# Patient Record
Sex: MALE | Race: WHITE | NOT HISPANIC OR LATINO | Employment: FULL TIME | URBAN - METROPOLITAN AREA
[De-identification: names, ages, dates, MRNs, and addresses within clinical notes are randomized per-mention and may not be internally consistent; named-entity substitution may affect disease eponyms.]

---

## 2021-02-10 DIAGNOSIS — Z31.41 FERTILITY TESTING: Primary | ICD-10-CM

## 2021-02-10 PROCEDURE — 89320 SEMEN ANAL VOL/COUNT/MOT: CPT | Performed by: OBSTETRICS & GYNECOLOGY

## 2021-02-24 NOTE — PROGRESS NOTES
Spoke with pt's girlfriend who is seeing Dr. Johnson for infertility.  The girlfriend states that pt has not received a call regarding scheduling his semen analysis.  I do see that a semen analysis was placed on 2/10 by Dr. Johnson but it may be the wrong order.  T'd up the correct semen analysis order and will route to Dr. Johnson to sign.  Gave pt's girlfriend the phone number to call if pt hasn't heard from the andrology lab to get the semen analysis scheduled.    Kelsi Correia, RN

## 2021-02-25 NOTE — PROGRESS NOTES
Radha Johnson, Kelsi Martinez, RN 2 hours ago (7:30 AM)     Very strange, not sure why it was not signed. I completed the order, please let me know if there are any other issues.     Radha Johnson DO    Message text      Violette Ivory, RN on 2/25/2021 at 9:45 AM

## 2022-09-04 ENCOUNTER — APPOINTMENT (OUTPATIENT)
Dept: GENERAL RADIOLOGY | Facility: CLINIC | Age: 42
End: 2022-09-04
Attending: EMERGENCY MEDICINE
Payer: COMMERCIAL

## 2022-09-04 ENCOUNTER — HOSPITAL ENCOUNTER (EMERGENCY)
Facility: CLINIC | Age: 42
Discharge: HOME | End: 2022-09-04
Attending: EMERGENCY MEDICINE | Admitting: EMERGENCY MEDICINE
Payer: COMMERCIAL

## 2022-09-04 VITALS
HEART RATE: 92 BPM | OXYGEN SATURATION: 98 % | SYSTOLIC BLOOD PRESSURE: 132 MMHG | TEMPERATURE: 99 F | DIASTOLIC BLOOD PRESSURE: 88 MMHG | RESPIRATION RATE: 16 BRPM

## 2022-09-04 DIAGNOSIS — S91.331A PUNCTURE WOUND OF RIGHT FOOT, INITIAL ENCOUNTER: ICD-10-CM

## 2022-09-04 PROCEDURE — 99284 EMERGENCY DEPT VISIT MOD MDM: CPT | Mod: 25 | Performed by: EMERGENCY MEDICINE

## 2022-09-04 PROCEDURE — 73630 X-RAY EXAM OF FOOT: CPT | Mod: RT

## 2022-09-04 PROCEDURE — 250N000011 HC RX IP 250 OP 636: Performed by: EMERGENCY MEDICINE

## 2022-09-04 PROCEDURE — 90471 IMMUNIZATION ADMIN: CPT | Performed by: EMERGENCY MEDICINE

## 2022-09-04 PROCEDURE — 90715 TDAP VACCINE 7 YRS/> IM: CPT | Performed by: EMERGENCY MEDICINE

## 2022-09-04 PROCEDURE — 99284 EMERGENCY DEPT VISIT MOD MDM: CPT | Performed by: EMERGENCY MEDICINE

## 2022-09-04 RX ORDER — CIPROFLOXACIN 500 MG/1
500 TABLET, FILM COATED ORAL 2 TIMES DAILY
Qty: 14 TABLET | Refills: 0 | Status: SHIPPED | OUTPATIENT
Start: 2022-09-04 | End: 2022-09-09

## 2022-09-04 RX ADMIN — CLOSTRIDIUM TETANI TOXOID ANTIGEN (FORMALDEHYDE INACTIVATED), CORYNEBACTERIUM DIPHTHERIAE TOXOID ANTIGEN (FORMALDEHYDE INACTIVATED), BORDETELLA PERTUSSIS TOXOID ANTIGEN (GLUTARALDEHYDE INACTIVATED), BORDETELLA PERTUSSIS FILAMENTOUS HEMAGGLUTININ ANTIGEN (FORMALDEHYDE INACTIVATED), BORDETELLA PERTUSSIS PERTACTIN ANTIGEN, AND BORDETELLA PERTUSSIS FIMBRIAE 2/3 ANTIGEN 0.5 ML: 5; 2; 2.5; 5; 3; 5 INJECTION, SUSPENSION INTRAMUSCULAR at 21:28

## 2022-09-05 NOTE — ED PROVIDER NOTES
History     Chief Complaint   Patient presents with     Puncture Wound     R foot, stepped on nail     HPI  Remington Tilley is a 42 year old male who presents for evaluation of a puncture wound to the right foot.  Approximately 7 hours ago he stepped on a nail in his shop with a puncture wound in the sole of the foot at the distal third right metatarsal region.  Areas become more sore and achy.  He states he is having trouble moving his third toe.    Allergies:  No Known Allergies    Problem List:    There are no problems to display for this patient.       Past Medical History:    No past medical history on file.    Past Surgical History:    No past surgical history on file.    Family History:    No family history on file.    Social History:  Marital Status:  Single [1]        Medications:    ciprofloxacin (CIPRO) 500 MG tablet          Review of Systems  All other systems are reviewed and are negative    Physical Exam   BP: (!) 146/91  Pulse: 94  Temp: 99  F (37.2  C)  Resp: 16  SpO2: 99 %      Physical Exam  Vitals reviewed.   Constitutional:       General: He is not in acute distress.     Appearance: He is not diaphoretic.   HENT:      Head: Normocephalic and atraumatic.   Eyes:      General: No scleral icterus.        Right eye: No discharge.         Left eye: No discharge.      Conjunctiva/sclera: Conjunctivae normal.   Pulmonary:      Effort: Pulmonary effort is normal.      Breath sounds: No stridor.   Musculoskeletal:      Cervical back: Normal range of motion.      Comments: Sole of the right foot reveals a puncture wound at the distal third metatarsal region.  Mild surrounding swelling.  No erythema.  No warmth.  Distal CMS intact.  Movement of the toes is preserved   Skin:     General: Skin is warm and dry.      Findings: No rash.   Neurological:      Mental Status: He is alert.      Comments: Normal speech and mentation   Psychiatric:         Judgment: Judgment normal.         ED Course                  Procedures              Critical Care time:  none               Results for orders placed or performed during the hospital encounter of 09/04/22 (from the past 24 hour(s))   XR Foot Right 3 Views    Narrative    EXAM: XR FOOT RIGHT G/E 3 VIEWS  LOCATION: [facility]  DATE/TIME: 9/4/2022 9:35 PM    INDICATION: Stepped on a nail at distal 3rd metatarsal. Pain.  COMPARISON: None.      Impression    IMPRESSION: No foreign body is evident. No fractures are identified. Normal joint spacing and alignment.       Medications   Tdap (tetanus-diphtheria-acell pertussis) (ADACEL) injection 0.5 mL (0.5 mLs Intramuscular Given 9/4/22 2128)       Assessments & Plan (with Medical Decision Making)  42-year-old male puncture wound to the right foot through shoe.  X-ray negative.  Tetanus updated.  Placed on Cipro.  Follow-up if any concerns.     I have reviewed the nursing notes.    I have reviewed the findings, diagnosis, plan and need for follow up with the patient.       New Prescriptions    CIPROFLOXACIN (CIPRO) 500 MG TABLET    Take 1 tablet (500 mg) by mouth 2 times daily for 5 days       Final diagnoses:   Puncture wound of right foot, initial encounter       9/4/2022   [facility] EMERGENCY DEPT     Dusty Millard MD  09/04/22 3548

## 2022-09-05 NOTE — ED TRIAGE NOTES
Stepped on nail around 1500 today and punctured R foot. Trouble with moving his middle toe. Tetanus shot possibly within the last 2 years.     Triage Assessment     Row Name 09/04/22 9498       Triage Assessment (Adult)    Airway WDL WDL       Respiratory WDL    Respiratory WDL WDL       Skin Circulation/Temperature WDL    Skin Circulation/Temperature WDL WDL       Cardiac WDL    Cardiac WDL WDL       Peripheral/Neurovascular WDL    Peripheral Neurovascular WDL X;pulse assessment    Pulse Assessment dorsalis pedis       Pulse Dorsalis Pedis    Left Dorsalis Pedis Pulse 2+ (normal)    Right Dorsalis Pedis Pulse 2+ (normal)       Cognitive/Neuro/Behavioral WDL    Cognitive/Neuro/Behavioral WDL WDL